# Patient Record
Sex: FEMALE
[De-identification: names, ages, dates, MRNs, and addresses within clinical notes are randomized per-mention and may not be internally consistent; named-entity substitution may affect disease eponyms.]

---

## 2018-01-20 ENCOUNTER — HOSPITAL ENCOUNTER (EMERGENCY)
Dept: HOSPITAL 31 - C.ER | Age: 38
Discharge: HOME | End: 2018-01-20
Payer: COMMERCIAL

## 2018-01-20 VITALS
RESPIRATION RATE: 18 BRPM | HEART RATE: 88 BPM | DIASTOLIC BLOOD PRESSURE: 85 MMHG | SYSTOLIC BLOOD PRESSURE: 142 MMHG | TEMPERATURE: 98.3 F

## 2018-01-20 VITALS — OXYGEN SATURATION: 99 %

## 2018-01-20 DIAGNOSIS — H10.9: Primary | ICD-10-CM

## 2018-01-20 DIAGNOSIS — J06.9: ICD-10-CM

## 2018-01-20 NOTE — C.PDOC
History Of Present Illness


37 year old female presents to the ER with a complaint of left eye swelling 

since yesterday. Patient states she took OTC medications but woke up today with 

redness, itching, and tearing from the eye. Denies recent eye injury, change in 

vision, or discharge.


Time Seen by Provider: 01/20/18 00:43


Chief Complaint (Nursing): Eye Problem


History Per: Patient


History/Exam Limitations: no limitations


Onset/Duration Of Symptoms: Days


Current Symptoms Are (Timing): Still Present


Injury To Eye?: No


Wears Contact Lens?: No


Associated Symptoms: Swelling, Itching.  denies: Decreased Vision, FB Sensation

, Discharge From Eye


Recent travel outside of the [place]: No





Past Medical History


Reviewed: Historical Data, Nursing Documentation, Vital Signs


Vital Signs: 


 Last Vital Signs











Temp  98.3 F   01/20/18 02:01


 


Pulse  88   01/20/18 02:01


 


Resp  18   01/20/18 02:01


 


BP  142/85   01/20/18 02:01


 


Pulse Ox  99   01/20/18 03:13











Family History: States: Unknown Family Hx





- Social History


Hx Alcohol Use: No


Hx Substance Use: No





- Immunization History


Hx Influenza Vaccination: No


Hx Pneumococcal Vaccination: No





Review Of Systems


Eyes: Positive for: Redness, Other (Tearing, Itching, Swelling).  Negative for: 

Vision Change





Physical Exam





- Physical Exam


Appears: Non-toxic, No Acute Distress


Skin: Normal Color, Warm, Dry


Head: Atraumatic, Normacephalic


Eye(s): bilateral: Normal Inspection, PERRL, EOMI, left: Other (No swelling to 

left lower eyelid. No foreign body on lid eversion. No fluorescein uptake.)


Ear(s): Bilateral: Normal


Nose: Normal


Oral Mucosa: Moist


Lips: Normal Appearing


Neck: Normal, Supple


Neurological/Psych: Oriented x3, Normal Speech, Normal Motor


Gait: Steady





ED Course And Treatment


O2 Sat by Pulse Oximetry: 99 (Room air)


Pulse Ox Interpretation: Normal





Medical Decision Making


Medical Decision Making: 


Claritin administered, patient reports improvement of symptoms. Will start on 

erythromycin and discharge with Rx and instructions to follow up with PMD.





Disposition





- Disposition


Referrals: 


[facility] [Outside]


Disposition: HOME/ ROUTINE


Disposition Time: 01:58


Condition: GOOD


Additional Instructions: 


Follow up with the medical doctor/clinic within 1-2 days without fail. Return 

if worsened. 


Prescriptions: 


Erythromycin 0.5% [Ilytocin] 1 appful OS TID #1 tube


Ibuprofen [Motrin] 600 mg PO TID #21 tab


Loratadine [Claritin] 10 mg PO DAILY #10 tab


predniSONE [Prednisone] 20 mg PO BID #10 tab


Instructions:  Upper Respiratory Infection (ED), Conjunctivitis (ED)


Forms:  [facility] (English)





- Clinical Impression


Clinical Impression: 


 Conjunctivitis, Upper respiratory infection








- PA / NP / Resident Statement


MD/DO has reviewed & agrees with the documentation as recorded.





- Scribe Statement


The provider has reviewed the documentation as recorded by the [place]rib[place] [place]





All medical record entries made by the Nataliia were at my direction and 

personally dictated by me. I have reviewed the chart and agree that the record 

accurately reflects my personal performance of the history, physical exam, 

medical decision making, and the department course for this patient. I have 

also personally directed, reviewed, and agree with the discharge instructions 

and disposition.

## 2018-03-26 ENCOUNTER — HOSPITAL ENCOUNTER (OUTPATIENT)
Dept: HOSPITAL 31 - C.ENDO | Age: 38
Discharge: HOME | End: 2018-03-26
Attending: INTERNAL MEDICINE
Payer: COMMERCIAL

## 2018-03-26 VITALS — SYSTOLIC BLOOD PRESSURE: 116 MMHG | HEART RATE: 67 BPM | RESPIRATION RATE: 21 BRPM | DIASTOLIC BLOOD PRESSURE: 73 MMHG

## 2018-03-26 VITALS — BODY MASS INDEX: 28 KG/M2

## 2018-03-26 VITALS — OXYGEN SATURATION: 100 % | TEMPERATURE: 98.6 F

## 2018-03-26 DIAGNOSIS — R10.13: Primary | ICD-10-CM

## 2018-03-26 DIAGNOSIS — K31.89: ICD-10-CM

## 2018-03-26 DIAGNOSIS — K29.70: ICD-10-CM

## 2018-03-26 PROCEDURE — 88313 SPECIAL STAINS GROUP 2: CPT

## 2018-03-26 PROCEDURE — 84703 CHORIONIC GONADOTROPIN ASSAY: CPT

## 2018-03-26 PROCEDURE — 88342 IMHCHEM/IMCYTCHM 1ST ANTB: CPT

## 2018-03-26 PROCEDURE — 88305 TISSUE EXAM BY PATHOLOGIST: CPT

## 2018-03-26 PROCEDURE — 43239 EGD BIOPSY SINGLE/MULTIPLE: CPT
